# Patient Record
Sex: FEMALE | Race: WHITE
[De-identification: names, ages, dates, MRNs, and addresses within clinical notes are randomized per-mention and may not be internally consistent; named-entity substitution may affect disease eponyms.]

---

## 2018-08-07 ENCOUNTER — HOSPITAL ENCOUNTER (EMERGENCY)
Dept: HOSPITAL 26 - MED | Age: 18
Discharge: HOME | End: 2018-08-07
Payer: COMMERCIAL

## 2018-08-07 VITALS — BODY MASS INDEX: 20.66 KG/M2 | HEIGHT: 64 IN | WEIGHT: 121 LBS

## 2018-08-07 VITALS — DIASTOLIC BLOOD PRESSURE: 64 MMHG | SYSTOLIC BLOOD PRESSURE: 109 MMHG

## 2018-08-07 VITALS — SYSTOLIC BLOOD PRESSURE: 105 MMHG | DIASTOLIC BLOOD PRESSURE: 62 MMHG

## 2018-08-07 DIAGNOSIS — N39.0: Primary | ICD-10-CM

## 2018-08-07 DIAGNOSIS — K59.00: ICD-10-CM

## 2018-08-07 LAB
APPEARANCE UR: (no result)
BILIRUB UR QL STRIP: NEGATIVE
COLOR UR: YELLOW
GLUCOSE UR STRIP-MCNC: NEGATIVE MG/DL
HGB UR QL STRIP: NEGATIVE
LEUKOCYTE ESTERASE UR QL STRIP: (no result)
NITRITE UR QL STRIP: NEGATIVE
PH UR STRIP: 6 [PH] (ref 5–9)
RBC #/AREA URNS HPF: (no result) /HPF (ref 0–5)
WBC,URINE: (no result) /HPF (ref 0–5)

## 2018-08-07 PROCEDURE — 81025 URINE PREGNANCY TEST: CPT

## 2018-08-07 PROCEDURE — 74018 RADEX ABDOMEN 1 VIEW: CPT

## 2018-08-07 PROCEDURE — 99285 EMERGENCY DEPT VISIT HI MDM: CPT

## 2018-08-07 PROCEDURE — 81001 URINALYSIS AUTO W/SCOPE: CPT

## 2018-08-07 PROCEDURE — 87086 URINE CULTURE/COLONY COUNT: CPT

## 2020-08-27 ENCOUNTER — HOSPITAL ENCOUNTER (EMERGENCY)
Dept: HOSPITAL 26 - MED | Age: 20
Discharge: HOME | End: 2020-08-27
Payer: COMMERCIAL

## 2020-08-27 VITALS — DIASTOLIC BLOOD PRESSURE: 63 MMHG | SYSTOLIC BLOOD PRESSURE: 104 MMHG

## 2020-08-27 VITALS — BODY MASS INDEX: 21.51 KG/M2 | HEIGHT: 64 IN | WEIGHT: 126 LBS

## 2020-08-27 DIAGNOSIS — R10.13: Primary | ICD-10-CM

## 2020-08-27 NOTE — NUR
19 YO FEMALE C/O DIARRHEA AND MILD MID ABDOMINAL PAIN SINCE YESTERDAY. PT 
DENIES ANY VOMITING

 MED HX: DENIES

## 2020-09-17 ENCOUNTER — HOSPITAL ENCOUNTER (EMERGENCY)
Dept: HOSPITAL 26 - MED | Age: 20
Discharge: HOME | End: 2020-09-17
Payer: COMMERCIAL

## 2020-09-17 VITALS — DIASTOLIC BLOOD PRESSURE: 91 MMHG | SYSTOLIC BLOOD PRESSURE: 136 MMHG

## 2020-09-17 VITALS — BODY MASS INDEX: 21.17 KG/M2 | HEIGHT: 64 IN | WEIGHT: 124 LBS

## 2020-09-17 DIAGNOSIS — N39.0: Primary | ICD-10-CM

## 2020-09-17 NOTE — NUR
PT PRESENTED TO ER ON FOOT WITH C/O DYSURIA AND LOWER BACK FLANK PAIN OF 2/10 
FOR 4 DAYS. PT DENIES NAUSEA VOMITING AND FEVER. DENIES HEMATURIA. DENIES 
PELVIC PAIN. NO PAST MEDICAL HX

## 2020-09-17 NOTE — NUR
Patient discharged with v/s stable. Written and verbal after care instructions 
given and explained. 

Patient alert, oriented and verbalized understanding of instructions. 
Ambulatory with steady gait. All questions addressed prior to discharge. ID 
band removed. Patient advised to follow up with PMD. Rx of CIPRO given. Patient 
educated on indication of medication including possible reaction and side 
effects. Opportunity to ask questions provided and answered. PT INSTRUCTED ON 
AVOIDING ANTACID MEDICATION WHILE TAKING CIPRO AND TO RETURN TO ED IF SYMPTOMS 
WORSEN

## 2021-09-06 ENCOUNTER — HOSPITAL ENCOUNTER (EMERGENCY)
Dept: HOSPITAL 26 - MED | Age: 21
Discharge: HOME | End: 2021-09-06
Payer: COMMERCIAL

## 2021-09-06 VITALS — DIASTOLIC BLOOD PRESSURE: 66 MMHG | SYSTOLIC BLOOD PRESSURE: 117 MMHG

## 2021-09-06 VITALS — BODY MASS INDEX: 21.52 KG/M2 | HEIGHT: 64 IN | WEIGHT: 126.06 LBS

## 2021-09-06 VITALS — SYSTOLIC BLOOD PRESSURE: 117 MMHG | DIASTOLIC BLOOD PRESSURE: 66 MMHG

## 2021-09-06 DIAGNOSIS — Z20.822: ICD-10-CM

## 2021-09-06 DIAGNOSIS — J02.9: Primary | ICD-10-CM

## 2021-09-06 NOTE — NUR
Patient discharged with v/s stable. Written and verbal after care instructions 
given and explained. 

Patient alert, oriented and verbalized understanding of instructions. 
Ambulatory with steady gait. All questions addressed prior to discharge. ID 
band removed. Patient advised to follow up with PMD. Rx of PCN and Ibuprofen 
given. Patient educated on indication of medication including possible reaction 
and side effects. Opportunity to ask questions provided and answered.

## 2021-11-23 ENCOUNTER — HOSPITAL ENCOUNTER (EMERGENCY)
Dept: HOSPITAL 26 - MED | Age: 21
Discharge: HOME | End: 2021-11-23
Payer: COMMERCIAL

## 2021-11-23 VITALS — HEIGHT: 64 IN | BODY MASS INDEX: 21.51 KG/M2 | WEIGHT: 126 LBS

## 2021-11-23 VITALS — DIASTOLIC BLOOD PRESSURE: 66 MMHG | SYSTOLIC BLOOD PRESSURE: 103 MMHG

## 2021-11-23 DIAGNOSIS — Z79.899: ICD-10-CM

## 2021-11-23 DIAGNOSIS — S63.501A: Primary | ICD-10-CM
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
## 2021-11-23 NOTE — NUR
Patient discharged with v/s stable. Written and verbal after care instructions 
given and explained. 

Patient alert, oriented and verbalized understanding of instructions. 
Ambulatory with steady gait. All questions addressed prior to discharge. ID 
band removed. Patient advised to follow up with PMD. Rx of IBUPROFEN given.  
Opportunity to ask questions provided and answered.

## 2023-07-10 ENCOUNTER — HOSPITAL ENCOUNTER (EMERGENCY)
Dept: HOSPITAL 26 - MED | Age: 23
Discharge: HOME | End: 2023-07-10
Payer: COMMERCIAL

## 2023-07-10 VITALS — WEIGHT: 137 LBS | BODY MASS INDEX: 23.39 KG/M2 | HEIGHT: 64 IN

## 2023-07-10 VITALS
SYSTOLIC BLOOD PRESSURE: 96 MMHG | OXYGEN SATURATION: 95 % | RESPIRATION RATE: 20 BRPM | DIASTOLIC BLOOD PRESSURE: 66 MMHG | HEART RATE: 101 BPM | TEMPERATURE: 98 F

## 2023-07-10 VITALS
TEMPERATURE: 98 F | HEART RATE: 95 BPM | SYSTOLIC BLOOD PRESSURE: 96 MMHG | DIASTOLIC BLOOD PRESSURE: 68 MMHG | RESPIRATION RATE: 16 BRPM | OXYGEN SATURATION: 96 %

## 2023-07-10 VITALS — OXYGEN SATURATION: 95 %

## 2023-07-10 DIAGNOSIS — Z79.899: ICD-10-CM

## 2023-07-10 DIAGNOSIS — S93.401A: Primary | ICD-10-CM
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
## 2023-07-10 NOTE — NUR
PT HAS HAD CRUTCH TRAINING AND ANKLE HAS BEEN WRAPPED WITH ACE WRAP. PT STATES 
HER PAIN HAS MINIMIZED TO A LEVEL 2. PT IS ABLE TO AMBULATE WITH CRUTCHES 
EFFECTIVELY.

## 2023-07-10 NOTE — NUR
PATIENT 23/F PRESENTS TO ED WITH LEFT SIDE ANKLE PAIN. PT STATES SHE HURT 
HERSELF THROWING A BASKETBALL. DENIES N/V/D; SKIN IS PINK/WARM/DRY; AAOX4 WITH 
EVEN AND UNABLE TO AMBULATE; LUNGS CLEAR BL; HR EVEN AND REGULAR; PT DENIES ANY 
FEVER, CP, SOB, OR COUGH AT THIS TIME; PATIENT STATES PAIN OF 8/10 AT THIS 
TIME; VSS; PATIENT POSITIONED FOR COMFORT; HOB ELEVATED; BEDRAILS UP X2; BED 
DOWN.CALL LIGHT WITH IN REACH. ER MD MADE AWARE OF PT STATUS.





NO PMXH 

KNA